# Patient Record
Sex: FEMALE | Race: WHITE | Employment: OTHER | URBAN - METROPOLITAN AREA
[De-identification: names, ages, dates, MRNs, and addresses within clinical notes are randomized per-mention and may not be internally consistent; named-entity substitution may affect disease eponyms.]

---

## 2019-01-01 ENCOUNTER — HOSPITAL ENCOUNTER (EMERGENCY)
Age: 79
End: 2020-01-01
Attending: EMERGENCY MEDICINE

## 2019-01-01 LAB
B-TYPE NATRIURETIC PEPTIDE: 118 PG/ML (ref 0–99.9)
BASE EXCESS VENOUS: -14 (ref -3–3)
CALCIUM IONIZED: 1.17 MMOL/L (ref 1.12–1.32)
CO2: 19 MMOL/L (ref 21–32)
GFR AFRICAN AMERICAN: >60
GFR NON-AFRICAN AMERICAN: 54
GLUCOSE BLD-MCNC: 292 MG/DL (ref 70–99)
HCO3 VENOUS: 16 MMOL/L (ref 23–29)
LACTATE: 8.96 MMOL/L (ref 0.4–2)
O2 SAT, VEN: 49 %
PCO2, VEN: 51.2 MM HG (ref 40–50)
PERFORMED ON: ABNORMAL
PERFORMED ON: NORMAL
PH VENOUS: 7.1 (ref 7.35–7.45)
PO2, VEN: 36 MM HG
POC ANION GAP: 10 (ref 10–20)
POC BUN: 18 MG/DL (ref 7–18)
POC CHLORIDE: 105 MMOL/L (ref 99–110)
POC CREATININE: 1 MG/DL (ref 0.6–1.2)
POC POTASSIUM: 4.6 MMOL/L (ref 3.5–5.1)
POC SAMPLE TYPE: ABNORMAL
POC SAMPLE TYPE: NORMAL
POC SODIUM: 134 MMOL/L (ref 136–145)
POC TROPONIN I: 0.08 NG/ML (ref 0–0.1)
TCO2 CALC VENOUS: 18 MMOL/L

## 2019-01-01 PROCEDURE — 82803 BLOOD GASES ANY COMBINATION: CPT

## 2019-01-01 PROCEDURE — 99285 EMERGENCY DEPT VISIT HI MDM: CPT

## 2019-01-01 PROCEDURE — 83605 ASSAY OF LACTIC ACID: CPT

## 2019-01-01 PROCEDURE — 4500000025 HC ED LEVEL 5 PROCEDURE

## 2019-01-01 PROCEDURE — 80047 BASIC METABLC PNL IONIZED CA: CPT

## 2019-01-01 PROCEDURE — 83880 ASSAY OF NATRIURETIC PEPTIDE: CPT

## 2019-01-01 PROCEDURE — 84484 ASSAY OF TROPONIN QUANT: CPT

## 2019-01-01 RX ORDER — SODIUM CHLORIDE 9 MG/ML
INJECTION, SOLUTION INTRAVENOUS
Status: DISCONTINUED
Start: 2019-01-01 | End: 2020-01-01 | Stop reason: HOSPADM

## 2019-01-01 RX ORDER — CARBAMAZEPINE 400 MG/1
400 TABLET, EXTENDED RELEASE ORAL
COMMUNITY
Start: 2019-01-01

## 2019-01-01 RX ORDER — LEVOTHYROXINE SODIUM 0.12 MG/1
125 TABLET ORAL
COMMUNITY
Start: 2019-01-01

## 2020-01-01 NOTE — ED PROVIDER NOTES
Intubation  Date/Time: 12/31/2019 9:10 PM  Performed by: SOL Sheffield  Authorized by: SOL Sheffield     Pre-procedure details:     Patient status:  Unresponsive    Pretreatment medications:  None    Paralytics:  None  Procedure details:     Preoxygenation:  Bag valve mask    CPR in progress: yes      Intubation method:  Oral    Oral intubation technique:  Direct    Laryngoscope blade: Mac 4    Tube size (mm):  7.0    Tube type:  Cuffed    Number of attempts:  1    Cricoid pressure: no      Tube visualized through cords: yes    Placement assessment:     ETT to lip:  23cm    ETT to teeth:  22cm    Tube secured with:  ETT reeves    Breath sounds:  Equal and absent over the epigastrium    Placement verification: chest rise, condensation, direct visualization, equal breath sounds, ETCO2 detector and tube exhalation    Post-procedure details:     Patient tolerance of procedure: Tolerated well, no immediate complications  Comments: The patient was emergently intubated secondary to difficulty in ventilating via the #4 iGel supraglottic airway placed by EMS. A grade 1 airway view was achieved via DL. No laryngeal edema or foreign body obstruction noted. The tube passed through the glottic opening without difficulty.            SOL Sheffield  12/31/19 2633
an hour prior to arrival.  The left tibial IO in place immediately on arrival right [facility] IV was established and labs sent on arrival here she is found to continue to be in PEA. She was given calcium, bicarbonate as well as epinephrine. She had 8 rounds of CPR performed with 4 doses of further epinephrine given. VBG showed a metabolic acidosis with a pH of 7.1 and a lactate of 8. No significant rise in PCO2. The on arrival she was being bagged with a gel in place. This was switched to a 7 oh ET tube there was confirmed with equal breath sounds bilaterally as well as end-tidal CO2. apparently after finding family were able to state that she was having some facial pain earlier in the day but otherwise was feeling well. She really has no medical history did have diet-controlled diabetes. She had a total amount of CPR performed for 30 minutes continue to have fixed and dilated pupils. She was having some difficulty time bagging and initial sats were 60% able to raise sats up to 94% but then slowly drifted down back to 60%. Ultrasound during pulse checks did not show any evidence of dilated right RV and no cardiac activity noted. She had equal lungs sliding bilaterally on ultrasound as well. At that point we brought family in the room discussed that she had an unknown downtime and then 30 minutes of CPR with continued PEA arrest, no cardiac activity on ultrasound fixed and dilated pupils. Grave outcome at this point so we did call time of death at 9:22 PM.      I was present for intubation performed by Trenna Lefort    Critical Care:  Due to the immediate potential for life-threatening deterioration due to cardiac arrest, I spent 16 minutes providing critical care. This time is excluding time spent performing procedures. Clinical Impression     1. Cardiac arrest [facility])        Disposition     PATIENT REFERRED TO:  No follow-up provider specified.     DISCHARGE MEDICATIONS:  New Prescriptions    No medications

## 2025-06-10 NOTE — ED NOTES
Pt arrived with with CPR in progress. Ai Lien on at arrival. IO and 3 doses of epi prior to arrival Pt was found in her car pulseless. Pt was seen 1 hour prior to cpr being started. 2106:epi given, 2108: pt intubated 7.0 23 at the lip, 2109: epi given, pt is pulsless and in asystole, pupils fixed and dialated, 2110 Bicarb and calcium given. 18 in [address] obtained, 2114: pulse check: pea, epi given, 2116: pulse check: pea, 2117: epi given, 2119 pulse check: pea, 2122 time of death called per Dr. Azar Hurd.  Family at [facility], RN  12/31/19 2128
Pt transported to [facility] by [address] [facility], RN  01/01/20 1422
ambulatory